# Patient Record
Sex: FEMALE | Race: WHITE | NOT HISPANIC OR LATINO
[De-identification: names, ages, dates, MRNs, and addresses within clinical notes are randomized per-mention and may not be internally consistent; named-entity substitution may affect disease eponyms.]

---

## 2023-05-26 ENCOUNTER — APPOINTMENT (OUTPATIENT)
Dept: OTOLARYNGOLOGY | Facility: CLINIC | Age: 18
End: 2023-05-26
Payer: COMMERCIAL

## 2023-05-26 VITALS
WEIGHT: 135 LBS | SYSTOLIC BLOOD PRESSURE: 124 MMHG | OXYGEN SATURATION: 100 % | DIASTOLIC BLOOD PRESSURE: 79 MMHG | BODY MASS INDEX: 26.5 KG/M2 | HEART RATE: 65 BPM | HEIGHT: 60 IN

## 2023-05-26 DIAGNOSIS — J35.01 CHRONIC TONSILLITIS: ICD-10-CM

## 2023-05-26 PROCEDURE — 99204 OFFICE O/P NEW MOD 45 MIN: CPT

## 2023-05-26 RX ORDER — CHLORHEXIDINE GLUCONATE, 0.12% ORAL RINSE 1.2 MG/ML
0.12 SOLUTION DENTAL
Qty: 1 | Refills: 3 | Status: ACTIVE | COMMUNITY
Start: 2023-05-26 | End: 1900-01-01

## 2023-05-26 NOTE — PHYSICAL EXAM
[Midline] : trachea located in midline position [Normal] : no abnormal secretions [de-identified] : 3+ bilateral

## 2023-05-26 NOTE — ASSESSMENT
[FreeTextEntry1] : Ms. ORDOÑEZ is a 17 year female with recurrent tonsillitis. On exam she does have 3+ tonsils bilateral\par \par - pt does meet [facility] criteria at this point\par -  r/b/a of tonsillectomy and adenoidectomy were explained to the patient.  Including risk of postoperative pain, dehydration, and bleeding within the first two weeks. \par -  typical postoperative course including soft diet and no exercise for 2 weeks were also discussed as well as need for 1-2 weeks off from work/school \par \par Extensive discussion had regarding r/b/a of above procedure. all questions were answered\par would like to try chlorhexidine gargles in the interim.\par – Rx sent for chlorhexidine\par – Follow-up with me as needed\par

## 2023-05-26 NOTE — END OF VISIT
[FreeTextEntry3] : I personally saw and examined BETSEY ORDOÑEZ in detail.  I spoke to FELICIA Rodriguez regarding the assessment and plan of care. I performed the procedures and relevant physical exam.  I have reviewed the above assessment and plan of care and I agree.  I have made changes to the body of the note wherever necessary and appropriate.\par

## 2023-05-26 NOTE — HISTORY OF PRESENT ILLNESS
[de-identified] : Ms. ORDOÑEZ is a 17 year female here with mom , she has had 5 episodes of tonsillitis since August some of which are strep positive, some negative. all treated with antibiotics,  Last treated with antibiotics early May, last year 4-5 infections\par - she clears infections completely after each treatment\par also c/o nasal congestion and seasonal allergies for which she takes Benadryl, pt does not like to use nose spray

## 2023-07-17 ENCOUNTER — APPOINTMENT (OUTPATIENT)
Dept: OTOLARYNGOLOGY | Facility: HOSPITAL | Age: 18
End: 2023-07-17

## 2023-07-26 ENCOUNTER — APPOINTMENT (OUTPATIENT)
Dept: OTOLARYNGOLOGY | Facility: CLINIC | Age: 18
End: 2023-07-26

## 2025-06-09 ENCOUNTER — APPOINTMENT (OUTPATIENT)
Dept: INTERNAL MEDICINE | Facility: CLINIC | Age: 20
End: 2025-06-09